# Patient Record
Sex: FEMALE | URBAN - METROPOLITAN AREA
[De-identification: names, ages, dates, MRNs, and addresses within clinical notes are randomized per-mention and may not be internally consistent; named-entity substitution may affect disease eponyms.]

---

## 2021-10-04 ENCOUNTER — OFFICE VISIT (OUTPATIENT)
Dept: URBAN - METROPOLITAN AREA CLINIC 81 | Facility: CLINIC | Age: 21
End: 2021-10-04

## 2021-10-04 DIAGNOSIS — H52.223 REGULAR ASTIGMATISM, BILATERAL: Primary | ICD-10-CM

## 2021-10-04 PROCEDURE — 92004 COMPRE OPH EXAM NEW PT 1/>: CPT | Performed by: OPTOMETRIST

## 2021-10-04 ASSESSMENT — INTRAOCULAR PRESSURE
OD: 19
OS: 19

## 2021-10-04 ASSESSMENT — KERATOMETRY
OS: 45.88
OD: 45.75

## 2021-10-04 ASSESSMENT — VISUAL ACUITY
OS: 20/25
OD: 20/25

## 2021-10-04 NOTE — IMPRESSION/PLAN
Impression: Regular astigmatism, bilateral: H52.223.

-h/o of use with lined bifocals, patient dislikes bifocal for cosmetic reasons Plan: Dispensed Rx for glasses to patient and instructed on normal adaptation period. Trial PALS.